# Patient Record
Sex: FEMALE | Race: WHITE
[De-identification: names, ages, dates, MRNs, and addresses within clinical notes are randomized per-mention and may not be internally consistent; named-entity substitution may affect disease eponyms.]

---

## 2020-04-19 ENCOUNTER — HOSPITAL ENCOUNTER (EMERGENCY)
Dept: HOSPITAL 63 - ER | Age: 20
Discharge: HOME | End: 2020-04-19
Payer: COMMERCIAL

## 2020-04-19 VITALS — HEIGHT: 62 IN | BODY MASS INDEX: 50.31 KG/M2 | WEIGHT: 273.37 LBS

## 2020-04-19 VITALS — SYSTOLIC BLOOD PRESSURE: 130 MMHG | DIASTOLIC BLOOD PRESSURE: 71 MMHG

## 2020-04-19 DIAGNOSIS — N89.8: Primary | ICD-10-CM

## 2020-04-19 DIAGNOSIS — R30.0: ICD-10-CM

## 2020-04-19 DIAGNOSIS — R35.0: ICD-10-CM

## 2020-04-19 LAB
APTT PPP: YELLOW S
BACTERIA #/AREA URNS HPF: (no result) /HPF
BILIRUB UR QL STRIP: (no result)
FIBRINOGEN PPP-MCNC: CLEAR MG/DL
GLUCOSE UR STRIP-MCNC: (no result) MG/DL
NITRITE UR QL STRIP: (no result)
RBC #/AREA URNS HPF: 0 /HPF (ref 0–2)
SP GR UR STRIP: 1.02
SQUAMOUS #/AREA URNS LPF: (no result) /LPF
UROBILINOGEN UR-MCNC: 0.2 MG/DL
WBC #/AREA URNS HPF: 0 /HPF (ref 0–4)

## 2020-04-19 PROCEDURE — 87591 N.GONORRHOEAE DNA AMP PROB: CPT

## 2020-04-19 PROCEDURE — 81001 URINALYSIS AUTO W/SCOPE: CPT

## 2020-04-19 PROCEDURE — 87491 CHLMYD TRACH DNA AMP PROBE: CPT

## 2020-04-19 PROCEDURE — 87480 CANDIDA DNA DIR PROBE: CPT

## 2020-04-19 PROCEDURE — 36415 COLL VENOUS BLD VENIPUNCTURE: CPT

## 2020-04-19 PROCEDURE — 81025 URINE PREGNANCY TEST: CPT

## 2020-04-19 PROCEDURE — 87660 TRICHOMONAS VAGIN DIR PROBE: CPT

## 2020-04-19 PROCEDURE — 99283 EMERGENCY DEPT VISIT LOW MDM: CPT

## 2020-04-19 PROCEDURE — 87510 GARDNER VAG DNA DIR PROBE: CPT

## 2020-04-19 NOTE — PHYS DOC
General Adult


EDM:


Chief Complaint:  VAGINAL PROBLEM





HPI:


HPI:


19-year-old female presents with vaginal discharge and discomfort.  The patient 

has not noticed increased frequency, but has had some dysuria.  She is noticed a

change in her vaginal discharge and some odor.  She has tested positive for 

chlamydia couple years ago.  She is sexually active.  She has no other 

complaints at this time.





Review of Systems:


Review of Systems:


Constitutional:  Denies fever or chills 


Eyes:  Denies change in visual acuity 


HENT:  Denies nasal congestion or sore throat 


Respiratory:  Denies cough or shortness of breath 


Cardiovascular:  Denies chest pain or edema 


GI:  Denies abdominal pain, nausea, vomiting, bloody stools or diarrhea 


:  Dysuria, vaginal discomfort, change in vaginal discharge


Musculoskeletal:  Denies back pain or joint pain 


Integument:  Denies rash 


Neurologic:  Denies headache, focal weakness or sensory changes 


Endocrine:  Denies polyuria or polydipsia 


Lymphatic:  Denies swollen glands 


Psychiatric:  Denies depression or anxiety





Heart Score:


Risk Factors:


Risk Factors:  DM, Current or recent (<one month) smoker, HTN, HLP, family 

history of CAD, obesity.


Risk Scores:


Score 0 - 3:  2.5% MACE over next 6 weeks - Discharge Home


Score 4 - 6:  20.3% MACE over next 6 weeks - Admit for Clinical Observation


Score 7 - 10:  72.7% MACE over next 6 weeks - Early Invasive Strategies





Allergies:


Allergies:





Allergies








Coded Allergies Type Severity Reaction Last Updated Verified


 


  No Known Drug Allergies    4/19/20 No











Physical Exam:


PE:





Constitutional: Well developed, morbidly obese, well nourished, no acute 

distress, non-toxic appearance. []


HENT: Normocephalic, atraumatic, bilateral external ears normal, oropharynx 

moist, no oral exudates, nose normal. []


Eyes: PERRLA, EOMI, conjunctiva normal, no discharge. [] 


Neck: Normal range of motion, no tenderness, supple, no stridor. [] 


Cardiovascular:Heart rate regular rhythm, no murmur []


Lungs & Thorax:  Bilateral breath sounds clear to auscultation []


Abdomen: Bowel sounds normal, soft, no tenderness, no masses, no pulsatile 

masses. [] 


Skin: Warm, dry, no erythema, no rash. [] 


Back: No tenderness, no CVA tenderness. [] 


Extremities: No tenderness, no cyanosis, no clubbing, ROM intact, no edema. [] 


Neurologic: Alert and oriented X 3, normal motor function, normal sensory 

function, no focal deficits noted. []


Psychologic: Affect normal, judgement normal, mood normal.


: Shaved pubic hair, normal external exam, slightly erythematous cervical 

office, whitish discharge, no pain with digital exam.





Current Patient Data:


Labs:





                                Laboratory Tests








Test


 4/19/20


01:39


 


POC Urine HCG, Qualitative


 hcg negative


(Negative)











EKG:


EKG:


[]





Radiology/Procedures:


Radiology/Procedures:


[]





Course & Med Decision Making:


Course & Med Decision Making


Pertinent Labs and Imaging studies reviewed. (See chart for details)





[]





Dragon Disclaimer:


Dragon Disclaimer:


This electronic medical record was generated, in whole or in part, using a voice

 recognition dictation system.





Departure


Departure:


Disposition:  01 HOME/RESIDENCE PRIOR TO ADM


Condition:  STABLE


Referrals:  


PCP,NO (PCP)











REBECCA VERDE DO                 Apr 19, 2020 02:09

## 2021-11-23 ENCOUNTER — HOSPITAL ENCOUNTER (OUTPATIENT)
Dept: HOSPITAL 61 - KCIC MRI | Age: 21
End: 2021-11-23
Payer: COMMERCIAL

## 2021-11-23 DIAGNOSIS — M67.431: Primary | ICD-10-CM

## 2021-11-23 PROCEDURE — 73221 MRI JOINT UPR EXTREM W/O DYE: CPT

## 2021-11-23 NOTE — [facility]
Examination: MRI of the right wrist without contrast



HISTORY: History of right wrist ganglion cyst



COMPARISON: None available 



Technique: Multiplanar, multisequence MR imaging of the right wrist was performed without contrast.



FINDINGS:



The alignment of the carpal bones grossly appears unremarkable. The alignment of the carpometacarpal 
joints, metacarpophalangeal joints grossly appears unremarkable. The scapholunate ligament, lunotriqu
etral ligament appears intact. The extensor tendons grossly appears intact. Minimal amount of fluid i
dentified in the second extensor compartment tendons at the level of the wrist likely mild tenosynovi
tis. There is fluid measuring 3.2 x 1.0 cm identified about the flexor carpi radialis tendon sheath e
xtending from the level of the wrist to the base of the metacarpals, best visualized on series series
 11 image 47 to image 32 could be ganglion cyst or tenosynovitis.. The triangular fibrocartilage comp
roni appears intact.



IMPRESSION:

1.  Fluid measuring 3.2 x 1.0 cm identified about the flexor carpi radialis tendon sheath extending f
rom the level of the wrist the base of the metacarpals, best visualized on series 11 image 47 to imag
e 32 could be ganglion cyst or tenosynovitis.

2.  Minimal amount of fluid identified in the second extensor compartment tendons at the level of the
 wrist likely mild tenosynovitis.



Electronically signed by: Rudy Dimas MD (11/23/2021 11:02 AM) [id number]